# Patient Record
Sex: MALE | Race: WHITE | NOT HISPANIC OR LATINO | Employment: OTHER | URBAN - METROPOLITAN AREA
[De-identification: names, ages, dates, MRNs, and addresses within clinical notes are randomized per-mention and may not be internally consistent; named-entity substitution may affect disease eponyms.]

---

## 2018-12-18 ENCOUNTER — HOSPITAL ENCOUNTER (OUTPATIENT)
Dept: CARDIAC REHAB | Facility: HOSPITAL | Age: 68
Discharge: HOME | End: 2018-12-18
Attending: INTERNAL MEDICINE | Admitting: INTERNAL MEDICINE

## 2019-06-26 RX ORDER — ATORVASTATIN CALCIUM 80 MG/1
80 TABLET, FILM COATED ORAL
Qty: 90 TABLET | Refills: 1 | Status: SHIPPED | OUTPATIENT
Start: 2019-06-26 | End: 2019-12-27

## 2019-06-26 RX ORDER — ATORVASTATIN CALCIUM 80 MG/1
80 TABLET, FILM COATED ORAL
COMMUNITY
Start: 2012-06-23 | End: 2019-06-26 | Stop reason: SDUPTHER

## 2019-07-01 RX ORDER — LOSARTAN POTASSIUM 25 MG/1
25 TABLET ORAL EVERY 24 HOURS
Qty: 90 TABLET | Refills: 1 | Status: SHIPPED | OUTPATIENT
Start: 2019-07-01 | End: 2019-12-27

## 2019-07-01 RX ORDER — LOSARTAN POTASSIUM 25 MG/1
25 TABLET ORAL EVERY 24 HOURS
COMMUNITY
Start: 2012-06-23 | End: 2019-07-01 | Stop reason: SDUPTHER

## 2019-11-15 ENCOUNTER — TELEPHONE (OUTPATIENT)
Dept: CARDIOLOGY | Facility: CLINIC | Age: 69
End: 2019-11-15

## 2019-11-15 RX ORDER — POTASSIUM CHLORIDE 1500 MG/1
TABLET, FILM COATED, EXTENDED RELEASE ORAL EVERY 24 HOURS
COMMUNITY
Start: 2018-12-05 | End: 2019-11-15 | Stop reason: SDUPTHER

## 2019-11-15 RX ORDER — POTASSIUM CHLORIDE 1500 MG/1
20 TABLET, FILM COATED, EXTENDED RELEASE ORAL DAILY
Qty: 90 TABLET | Refills: 1 | Status: SHIPPED | OUTPATIENT
Start: 2019-11-15 | End: 2020-05-26

## 2019-11-15 NOTE — TELEPHONE ENCOUNTER
Saw [facility]  Potasium cl 20 meo ER, 1 daily, 90 day supply with refills  Call from daughter, April [phone]

## 2019-12-27 RX ORDER — ATORVASTATIN CALCIUM 80 MG/1
80 TABLET, FILM COATED ORAL
Qty: 30 TABLET | Refills: 0 | Status: SHIPPED | OUTPATIENT
Start: 2019-12-27 | End: 2020-01-08 | Stop reason: SDUPTHER

## 2019-12-27 RX ORDER — LOSARTAN POTASSIUM 25 MG/1
25 TABLET ORAL EVERY 24 HOURS
Qty: 90 TABLET | OUTPATIENT
Start: 2019-12-27

## 2019-12-27 RX ORDER — ATORVASTATIN CALCIUM 80 MG/1
80 TABLET, FILM COATED ORAL
Qty: 90 TABLET | OUTPATIENT
Start: 2019-12-27

## 2019-12-27 RX ORDER — LOSARTAN POTASSIUM 25 MG/1
25 TABLET ORAL EVERY 24 HOURS
Qty: 30 TABLET | Refills: 0 | Status: SHIPPED | OUTPATIENT
Start: 2019-12-27 | End: 2020-01-08 | Stop reason: SDUPTHER

## 2020-01-08 ENCOUNTER — OFFICE VISIT (OUTPATIENT)
Dept: CARDIOLOGY | Facility: CLINIC | Age: 70
End: 2020-01-08

## 2020-01-08 VITALS
OXYGEN SATURATION: 98 % | SYSTOLIC BLOOD PRESSURE: 94 MMHG | WEIGHT: 177 LBS | BODY MASS INDEX: 24.78 KG/M2 | DIASTOLIC BLOOD PRESSURE: 60 MMHG | HEART RATE: 61 BPM | HEIGHT: 71 IN

## 2020-01-08 DIAGNOSIS — E78.2 MIXED HYPERLIPIDEMIA: ICD-10-CM

## 2020-01-08 DIAGNOSIS — I25.5 ISCHEMIC CARDIOMYOPATHY: Primary | ICD-10-CM

## 2020-01-08 DIAGNOSIS — I25.10 CORONARY ARTERY DISEASE INVOLVING NATIVE CORONARY ARTERY OF NATIVE HEART WITHOUT ANGINA PECTORIS: ICD-10-CM

## 2020-01-08 DIAGNOSIS — I10 ESSENTIAL HYPERTENSION: ICD-10-CM

## 2020-01-08 PROCEDURE — 99213 OFFICE O/P EST LOW 20 MIN: CPT | Performed by: INTERNAL MEDICINE

## 2020-01-08 RX ORDER — CYANOCOBALAMIN (VITAMIN B-12) 5000 MCG
TABLET,DISINTEGRATING ORAL
COMMUNITY
End: 2020-10-07 | Stop reason: ALTCHOICE

## 2020-01-08 RX ORDER — ALLOPURINOL 300 MG/1
TABLET ORAL DAILY
COMMUNITY
Start: 2019-10-31

## 2020-01-08 RX ORDER — CARVEDILOL 6.25 MG/1
6.25 TABLET ORAL 2 TIMES DAILY WITH MEALS
COMMUNITY
Start: 2020-01-07

## 2020-01-08 RX ORDER — LOSARTAN POTASSIUM 25 MG/1
25 TABLET ORAL EVERY 24 HOURS
Qty: 90 TABLET | Refills: 1 | Status: SHIPPED | OUTPATIENT
Start: 2020-01-08 | End: 2020-01-28

## 2020-01-08 RX ORDER — THEOPHYLLINE 400 MG/1
TABLET, EXTENDED RELEASE ORAL DAILY
COMMUNITY
Start: 2019-10-14

## 2020-01-08 RX ORDER — FUROSEMIDE 40 MG/1
TABLET ORAL DAILY
COMMUNITY
Start: 2019-10-31

## 2020-01-08 RX ORDER — ATORVASTATIN CALCIUM 80 MG/1
80 TABLET, FILM COATED ORAL
Qty: 90 TABLET | Refills: 1 | Status: SHIPPED | OUTPATIENT
Start: 2020-01-08 | End: 2020-01-28

## 2020-01-08 RX ORDER — CLOPIDOGREL BISULFATE 75 MG/1
TABLET ORAL DAILY
COMMUNITY
Start: 2019-10-29

## 2020-01-08 RX ORDER — IPRATROPIUM/ALBUTEROL SULFATE 20-100 MCG
MIST INHALER (GRAM) INHALATION
COMMUNITY
Start: 2019-12-30

## 2020-01-08 NOTE — PROGRESS NOTES
"    Subjective:     Encounter Date:01/08/2020      Patient ID: Malachi Sanford is a 69 y.o. male.    Chief Complaint: Follow-up for CAD, HTN, & HLD  History of Present Illness     69-year-old white male patient with known history of CAD status post CABG almost 18 years ago history of COPD unfortunately continues to smoke history of dyslipidemia hypertension comes to see me for follow-up     patient was advised to get a stress Myoview and echocardiogram   patient refused stress test but agreed to get the echocardiogram   Patient echocardiogram December 2018 EF 35-40% with anterolateral wall hypokinesis   I advise cardiac catheterization but patient refused   continue current medications   patient was strongly advised to modify cardiac risk factors and stop smoking  Patient would like to see me once a year he fully understand the risks of sudden cardiac death  But refused any further cardiac intervention at this point including cardiac cath ICD        Past Medical History:   Diagnosis Date   • Cardiomyopathy (CMS/HCC)    • CHF (congestive heart failure) (CMS/[facility])    • COPD (chronic obstructive pulmonary disease) (CMS/[facility])    • Coronary heart disease    • Depression    • Hyperlipidemia    • Hypertension      Past Surgical History:   Procedure Laterality Date   • CORONARY ARTERY BYPASS GRAFT      x4   • KNEE SURGERY Right      BP 94/60 (BP Location: Left arm, Patient Position: Sitting, Cuff Size: Adult)   Pulse 61   Ht 180.3 cm (71\")   Wt 80.3 kg (177 lb)   SpO2 98%   BMI 24.69 kg/m²   Family History   Problem Relation Age of Onset   • No Known Problems Mother    • No Known Problems Father    • No Known Problems Sister    • Heart disease Brother    • No Known Problems Maternal Aunt    • No Known Problems Maternal Uncle    • No Known Problems Paternal Aunt    • No Known Problems Paternal Uncle    • No Known Problems Maternal Grandmother    • No Known Problems Maternal Grandfather    • No Known Problems Paternal " Grandmother    • No Known Problems Paternal Grandfather    • No Known Problems Other    • Anemia Neg Hx    • Arrhythmia Neg Hx    • Asthma Neg Hx    • Clotting disorder Neg Hx    • Fainting Neg Hx    • Heart attack Neg Hx    • Heart failure Neg Hx    • Hyperlipidemia Neg Hx    • Hypertension Neg Hx        Current Outpatient Medications:   •  allopurinol (ZYLOPRIM) 300 MG tablet, Take  by mouth Daily., Disp: , Rfl:   •  aspirin 81 MG tablet, Take 81 mg by mouth Daily., Disp: , Rfl:   •  atorvastatin (LIPITOR) 80 MG tablet, Take 1 tablet by mouth every night at bedtime., Disp: 90 tablet, Rfl: 1  •  BREO ELLIPTA 200-25 MCG/INH inhaler, INL 1 PUFF PO QD, Disp: , Rfl:   •  carvedilol (COREG) 6.25 MG tablet, , Disp: , Rfl:   •  clopidogrel (PLAVIX) 75 MG tablet, Take  by mouth Daily., Disp: , Rfl:   •  COMBIVENT RESPIMAT  MCG/ACT inhaler, INL 1 PUFF PO FOUR TIMES DAILY. MAY TK ADDITIONAL PFS AS NEEDED. NTE 6 PFS IN 24 H, Disp: , Rfl:   •  Cyanocobalamin (VITAMIN B-12) 5000 MCG tablet dispersible, Take  by mouth., Disp: , Rfl:   •  furosemide (LASIX) 40 MG tablet, Take  by mouth Daily., Disp: , Rfl:   •  losartan (COZAAR) 25 MG tablet, Take 1 tablet by mouth Daily., Disp: 90 tablet, Rfl: 1  •  potassium chloride ER (K-TAB) 20 MEQ tablet controlled-release ER tablet, Take 1 tablet by mouth Daily., Disp: 90 tablet, Rfl: 1  •  sertraline (ZOLOFT) 50 MG tablet, Take  by mouth Daily., Disp: , Rfl:   •  theophylline (UNIPHYL) 400 MG 24 hr tablet, Take  by mouth Daily., Disp: , Rfl:   Social History     Socioeconomic History   • Marital status:      Spouse name: Not on file   • Number of children: Not on file   • Years of education: Not on file   • Highest education level: Not on file   Tobacco Use   • Smoking status: Current Every Day Smoker     Packs/day: 0.50     Types: Cigarettes   • Smokeless tobacco: Current User   Substance and Sexual Activity   • Alcohol use: Never     Frequency: Never   • Drug use: Yes      Types: Marijuana   • Sexual activity: Defer     No Known Allergies  Review of Systems   Constitution: Negative for fever and malaise/fatigue.   HENT: Negative for congestion and hearing loss.    Eyes: Negative for double vision and visual disturbance.   Cardiovascular: Negative for chest pain, claudication, dyspnea on exertion, leg swelling and syncope.   Respiratory: Negative for cough and shortness of breath.    Endocrine: Negative for cold intolerance.   Skin: Negative for color change and rash.   Musculoskeletal: Negative for arthritis and joint pain.   Gastrointestinal: Negative for abdominal pain and heartburn.   Genitourinary: Negative for hematuria.   Neurological: Negative for excessive daytime sleepiness and dizziness.   Psychiatric/Behavioral: Negative for depression. The patient is not nervous/anxious.    All other systems reviewed and are negative.             Objective:     Physical Exam   Constitutional: He is oriented to person, place, and time. He appears well-developed and well-nourished. He is cooperative.   HENT:   Head: Normocephalic and atraumatic.   Mouth/Throat: Uvula is midline and oropharynx is clear and moist. No oral lesions.   Eyes: Conjunctivae are normal. No scleral icterus.   Neck: Trachea normal. Neck supple. No JVD present. Carotid bruit is not present. No thyromegaly present.   Cardiovascular: Normal rate, regular rhythm, S1 normal, S2 normal, normal heart sounds, intact distal pulses and normal pulses. PMI is not displaced. Exam reveals no gallop and no friction rub.   No murmur heard.  Pulmonary/Chest: Effort normal and breath sounds normal.   Abdominal: Soft. Bowel sounds are normal.   Musculoskeletal: Normal range of motion.   Neurological: He is alert and oriented to person, place, and time. He has normal strength.   No focal deficits   Skin: Skin is warm. No cyanosis.   Psychiatric: He has a normal mood and affect.       Procedures    Lab Review:       Assessment:           Diagnosis Plan   1. Ischemic cardiomyopathy  Adult Transthoracic Echo Complete W/ Cont if Necessary Per Protocol   2. Essential hypertension  Adult Transthoracic Echo Complete W/ Cont if Necessary Per Protocol   3. Mixed hyperlipidemia  Adult Transthoracic Echo Complete W/ Cont if Necessary Per Protocol   4. Coronary artery disease involving native coronary artery of native heart without angina pectoris  Adult Transthoracic Echo Complete W/ Cont if Necessary Per Protocol          Plan:       Most probably ischemic cardiomyopathy on conservative treatment patient fully understand the risks of sudden cardiac death  Needs to aggressively continue hypertension dyslipidemia patient was advised to stop smoking

## 2020-01-13 ENCOUNTER — TELEPHONE (OUTPATIENT)
Dept: CARDIOLOGY | Facility: CLINIC | Age: 70
End: 2020-01-13

## 2020-01-13 NOTE — TELEPHONE ENCOUNTER
Mr. Sanford was in office last week and an ECHO was ordered.   Just confirming if ECHO is needed now or in a year with follow up?  Please advise.

## 2020-01-28 RX ORDER — LOSARTAN POTASSIUM 25 MG/1
25 TABLET ORAL EVERY 24 HOURS
Qty: 90 TABLET | Refills: 2 | Status: SHIPPED | OUTPATIENT
Start: 2020-01-28 | End: 2020-07-28

## 2020-01-28 RX ORDER — ATORVASTATIN CALCIUM 80 MG/1
80 TABLET, FILM COATED ORAL
Qty: 90 TABLET | Refills: 2 | Status: SHIPPED | OUTPATIENT
Start: 2020-01-28 | End: 2020-07-28

## 2020-05-26 RX ORDER — POTASSIUM CHLORIDE 1500 MG/1
20 TABLET, FILM COATED, EXTENDED RELEASE ORAL DAILY
Qty: 90 TABLET | Refills: 3 | Status: SHIPPED | OUTPATIENT
Start: 2020-05-26 | End: 2021-01-01

## 2020-07-28 RX ORDER — ATORVASTATIN CALCIUM 80 MG/1
80 TABLET, FILM COATED ORAL
Qty: 90 TABLET | Refills: 2 | Status: SHIPPED | OUTPATIENT
Start: 2020-07-28

## 2020-07-28 RX ORDER — LOSARTAN POTASSIUM 25 MG/1
25 TABLET ORAL EVERY 24 HOURS
Qty: 90 TABLET | Refills: 2 | Status: SHIPPED | OUTPATIENT
Start: 2020-07-28 | End: 2021-01-01

## 2020-09-10 ENCOUNTER — TRANSCRIBE ORDERS (OUTPATIENT)
Dept: ADMINISTRATIVE | Facility: HOSPITAL | Age: 70
End: 2020-09-10

## 2020-09-10 DIAGNOSIS — F17.200 TOBACCO USE DISORDER: Primary | ICD-10-CM

## 2020-09-16 ENCOUNTER — APPOINTMENT (OUTPATIENT)
Dept: CT IMAGING | Facility: HOSPITAL | Age: 70
End: 2020-09-16

## 2020-09-22 ENCOUNTER — HOSPITAL ENCOUNTER (OUTPATIENT)
Dept: CT IMAGING | Facility: HOSPITAL | Age: 70
Discharge: HOME | End: 2020-09-22
Admitting: FAMILY MEDICINE

## 2020-09-22 DIAGNOSIS — F17.200 TOBACCO USE DISORDER: ICD-10-CM

## 2020-09-22 PROCEDURE — G0297 LDCT FOR LUNG CA SCREEN: HCPCS

## 2020-09-25 ENCOUNTER — TRANSCRIBE ORDERS (OUTPATIENT)
Dept: ADMINISTRATIVE | Facility: HOSPITAL | Age: 70
End: 2020-09-25

## 2020-09-25 DIAGNOSIS — I71.40 ABDOMINAL AORTIC ANEURYSM WITHOUT RUPTURE (HCC): Primary | ICD-10-CM

## 2020-10-01 ENCOUNTER — HOSPITAL ENCOUNTER (OUTPATIENT)
Dept: CT IMAGING | Facility: HOSPITAL | Age: 70
Discharge: HOME | End: 2020-10-01
Admitting: FAMILY MEDICINE

## 2020-10-01 DIAGNOSIS — I71.40 ABDOMINAL AORTIC ANEURYSM WITHOUT RUPTURE (HCC): ICD-10-CM

## 2020-10-01 LAB — CREAT BLDA-MCNC: 1 MG/DL (ref 0.6–1.3)

## 2020-10-01 PROCEDURE — 74174 CTA ABD&PLVS W/CONTRAST: CPT

## 2020-10-01 PROCEDURE — 0 IOPAMIDOL PER 1 ML: Performed by: FAMILY MEDICINE

## 2020-10-01 PROCEDURE — 82565 ASSAY OF CREATININE: CPT

## 2020-10-01 RX ADMIN — IOPAMIDOL 100 ML: 755 INJECTION, SOLUTION INTRAVENOUS at 17:00

## 2020-10-07 ENCOUNTER — OFFICE VISIT (OUTPATIENT)
Dept: CARDIOLOGY | Facility: CLINIC | Age: 70
End: 2020-10-07

## 2020-10-07 VITALS
OXYGEN SATURATION: 97 % | HEART RATE: 66 BPM | DIASTOLIC BLOOD PRESSURE: 55 MMHG | HEIGHT: 71 IN | SYSTOLIC BLOOD PRESSURE: 90 MMHG | BODY MASS INDEX: 22.82 KG/M2 | WEIGHT: 163 LBS

## 2020-10-07 DIAGNOSIS — I10 ESSENTIAL HYPERTENSION: ICD-10-CM

## 2020-10-07 DIAGNOSIS — I71.40 AAA (ABDOMINAL AORTIC ANEURYSM) WITHOUT RUPTURE (HCC): Primary | ICD-10-CM

## 2020-10-07 DIAGNOSIS — I25.5 ISCHEMIC CARDIOMYOPATHY: ICD-10-CM

## 2020-10-07 DIAGNOSIS — E78.2 MIXED HYPERLIPIDEMIA: ICD-10-CM

## 2020-10-07 DIAGNOSIS — I25.10 CORONARY ARTERY DISEASE INVOLVING NATIVE CORONARY ARTERY OF NATIVE HEART WITHOUT ANGINA PECTORIS: ICD-10-CM

## 2020-10-07 PROCEDURE — 99214 OFFICE O/P EST MOD 30 MIN: CPT | Performed by: INTERNAL MEDICINE

## 2020-10-07 RX ORDER — FERROUS SULFATE 325(65) MG
1 TABLET ORAL 2 TIMES DAILY
COMMUNITY
Start: 2020-09-14

## 2020-10-07 RX ORDER — NITROGLYCERIN 0.4 MG/1
TABLET SUBLINGUAL
COMMUNITY
Start: 2020-08-10

## 2020-10-07 NOTE — PROGRESS NOTES
"    Subjective:     Encounter Date:10/07/2020      Patient ID: Malachi Sanford is a 70 y.o. male.    Chief Complaint: AAA and follow-up coronary Artery Disease, Hypertension, & Cardiomyopathy  History of Present Illness     70-year-old white male patient with known history of CAD status post CABG almost 18 years ago history of COPD unfortunately continues to smoke history of dyslipidemia hypertension comes to see me for follow-up      patient was advised to get a stress Myoview and echocardiogram   patient refused stress test but agreed to get the echocardiogram and it was scheduled for January 2021    Patient echocardiogram December 2018 EF 35-40% with anterolateral wall hypokinesis   I advise cardiac catheterization but patient refused   continue current medications   patient was strongly advised to modify cardiac risk factors and stop smoking  Patient would like to see me once a year he fully understand the risks of sudden cardiac death  But refused any further cardiac intervention at this point including cardiac cath ICD    Recently patient had a CT scan of abdomen which showed 6.7 cm aneurysm  So I again advised patient to stop smoking will have a vascular surgery consultation    The following portions of the patient's history were reviewed and updated as appropriate: Allergies current medications past family history past medical history past social history past surgical history problem list and review of systems  Past Medical History:   Diagnosis Date   • Cardiomyopathy (CMS/HCC)    • CHF (congestive heart failure) (CMS/HCC)    • COPD (chronic obstructive pulmonary disease) (CMS/HCC)    • Coronary heart disease    • Depression    • Hyperlipidemia    • Hypertension      Past Surgical History:   Procedure Laterality Date   • CORONARY ARTERY BYPASS GRAFT      x4   • KNEE SURGERY Right      BP 90/55 (BP Location: Left arm, Patient Position: Sitting, Cuff Size: Adult)   Pulse 66   Ht 180.3 cm (71\")   Wt 73.9 kg " (163 lb)   SpO2 97%   BMI 22.73 kg/m²   Family History   Problem Relation Age of Onset   • No Known Problems Mother    • No Known Problems Father    • No Known Problems Sister    • Heart disease Brother    • No Known Problems Maternal Aunt    • No Known Problems Maternal Uncle    • No Known Problems Paternal Aunt    • No Known Problems Paternal Uncle    • No Known Problems Maternal Grandmother    • No Known Problems Maternal Grandfather    • No Known Problems Paternal Grandmother    • No Known Problems Paternal Grandfather    • No Known Problems Other    • Anemia Neg Hx    • Arrhythmia Neg Hx    • Asthma Neg Hx    • Clotting disorder Neg Hx    • Fainting Neg Hx    • Heart attack Neg Hx    • Heart failure Neg Hx    • Hyperlipidemia Neg Hx    • Hypertension Neg Hx        Current Outpatient Medications:   •  allopurinol (ZYLOPRIM) 300 MG tablet, Take  by mouth Daily., Disp: , Rfl:   •  aspirin 81 MG tablet, Take 81 mg by mouth Daily., Disp: , Rfl:   •  atorvastatin (LIPITOR) 80 MG tablet, TAKE 1 TABLET BY MOUTH EVERY NIGHT AT BEDTIME, Disp: 90 tablet, Rfl: 2  •  BREO ELLIPTA 200-25 MCG/INH inhaler, INL 1 PUFF PO QD, Disp: , Rfl:   •  carvedilol (COREG) 6.25 MG tablet, Take 6.25 mg by mouth 2 (Two) Times a Day With Meals., Disp: , Rfl:   •  clopidogrel (PLAVIX) 75 MG tablet, Take  by mouth Daily., Disp: , Rfl:   •  COMBIVENT RESPIMAT  MCG/ACT inhaler, INL 1 PUFF PO FOUR TIMES DAILY. MAY TK ADDITIONAL PFS AS NEEDED. NTE 6 PFS IN 24 H, Disp: , Rfl:   •  ferrous sulfate 325 (65 FE) MG tablet, Take 1 tablet by mouth 2 (Two) Times a Day., Disp: , Rfl:   •  furosemide (LASIX) 40 MG tablet, Take  by mouth Daily., Disp: , Rfl:   •  losartan (COZAAR) 25 MG tablet, TAKE 1 TABLET BY MOUTH DAILY, Disp: 90 tablet, Rfl: 2  •  nitroglycerin (NITROSTAT) 0.4 MG SL tablet, , Disp: , Rfl:   •  potassium chloride ER (K-TAB) 20 MEQ tablet controlled-release ER tablet, TAKE 1 TABLET BY MOUTH DAILY, Disp: 90 tablet, Rfl: 3  •   sertraline (ZOLOFT) 50 MG tablet, Take  by mouth Daily., Disp: , Rfl:   •  theophylline (UNIPHYL) 400 MG 24 hr tablet, Take  by mouth Daily., Disp: , Rfl:   Social History     Socioeconomic History   • Marital status:      Spouse name: Not on file   • Number of children: Not on file   • Years of education: Not on file   • Highest education level: Not on file   Tobacco Use   • Smoking status: Current Every Day Smoker     Packs/day: 0.50     Types: Cigarettes   • Smokeless tobacco: Never Used   Substance and Sexual Activity   • Alcohol use: Never     Frequency: Never   • Drug use: Yes     Types: Marijuana   • Sexual activity: Defer     No Known Allergies  Review of Systems   Constitution: Negative for fever and malaise/fatigue.   HENT: Negative for congestion and hearing loss.    Eyes: Negative for double vision and visual disturbance.   Cardiovascular: Negative for chest pain, claudication, dyspnea on exertion, leg swelling and syncope.   Respiratory: Negative for cough and shortness of breath.    Endocrine: Negative for cold intolerance.   Skin: Negative for color change and rash.   Musculoskeletal: Negative for arthritis and joint pain.   Gastrointestinal: Negative for abdominal pain and heartburn.   Genitourinary: Negative for hematuria.   Neurological: Negative for excessive daytime sleepiness and dizziness.   Psychiatric/Behavioral: Negative for depression. The patient is not nervous/anxious.    All other systems reviewed and are negative.             Objective:     Constitutional:       Appearance: Well-developed.   Eyes:      General: No scleral icterus.     Conjunctiva/sclera: Conjunctivae normal.   HENT:      Head: Normocephalic and atraumatic.    Mouth/Throat:      Mouth: No oral lesions.      Pharynx: Uvula midline.   Neck:      Musculoskeletal: Neck supple.      Thyroid: No thyromegaly.      Vascular: No carotid bruit or JVD.      Trachea: Trachea normal.   Pulmonary:      Effort: Pulmonary effort  is normal.      Breath sounds: Normal breath sounds.   Cardiovascular:      Normal rate. Regular rhythm.      No gallop.   Pulses:     Decreased pulses.   Abdominal:      General: Bowel sounds are normal.      Palpations: Abdomen is soft.   Musculoskeletal: Normal range of motion.   Skin:     General: Skin is warm. There is no cyanosis.   Neurological:      Mental Status: Alert and oriented to person, place, and time.      Comments: No focal deficits   Psychiatric:         Behavior: Behavior is cooperative.         Procedures    Lab Review:       Assessment:          Diagnosis Plan   1. AAA (abdominal aortic aneurysm) without rupture (CMS/HCC)     2. Coronary artery disease involving native coronary artery of native heart without angina pectoris     3. Ischemic cardiomyopathy     4. Mixed hyperlipidemia     5. Essential hypertension            Plan:         AAA needs vascular surgery evaluation ASAP  Patient was strongly advised to stop smoking  Ischemic cardiomyopathy patient previously refused further intervention  Echocardiogram in the near future  Continue aggressive control of hypertension dyslipidemia  Needs to modify cardiac risk factors stop smoking

## 2020-10-08 ENCOUNTER — APPOINTMENT (OUTPATIENT)
Dept: VASCULAR SURGERY | Facility: HOSPITAL | Age: 70
End: 2020-10-08

## 2020-10-08 PROCEDURE — G0463 HOSPITAL OUTPT CLINIC VISIT: HCPCS

## 2020-10-26 ENCOUNTER — APPOINTMENT (OUTPATIENT)
Dept: PET IMAGING | Facility: HOSPITAL | Age: 70
End: 2020-10-26

## 2021-01-01 ENCOUNTER — LAB (OUTPATIENT)
Dept: LAB | Facility: HOSPITAL | Age: 71
End: 2021-01-01

## 2021-01-01 ENCOUNTER — OFFICE VISIT (OUTPATIENT)
Dept: CARDIOLOGY | Facility: CLINIC | Age: 71
End: 2021-01-01

## 2021-01-01 ENCOUNTER — HOSPITAL ENCOUNTER (OUTPATIENT)
Dept: CARDIOLOGY | Facility: HOSPITAL | Age: 71
Discharge: HOME | End: 2021-03-23
Admitting: INTERNAL MEDICINE

## 2021-01-01 VITALS
TEMPERATURE: 97.1 F | OXYGEN SATURATION: 99 % | HEIGHT: 71 IN | HEART RATE: 76 BPM | BODY MASS INDEX: 23.94 KG/M2 | SYSTOLIC BLOOD PRESSURE: 95 MMHG | DIASTOLIC BLOOD PRESSURE: 66 MMHG | WEIGHT: 171 LBS

## 2021-01-01 VITALS — HEIGHT: 71 IN | WEIGHT: 171 LBS | BODY MASS INDEX: 23.94 KG/M2 | TEMPERATURE: 97.8 F

## 2021-01-01 VITALS — HEIGHT: 71 IN | WEIGHT: 171 LBS | BODY MASS INDEX: 23.94 KG/M2

## 2021-01-01 DIAGNOSIS — I25.10 CORONARY ARTERY DISEASE INVOLVING NATIVE CORONARY ARTERY OF NATIVE HEART WITHOUT ANGINA PECTORIS: ICD-10-CM

## 2021-01-01 DIAGNOSIS — E78.2 MIXED HYPERLIPIDEMIA: ICD-10-CM

## 2021-01-01 DIAGNOSIS — I25.5 ISCHEMIC CARDIOMYOPATHY: ICD-10-CM

## 2021-01-01 DIAGNOSIS — I50.43 ACUTE ON CHRONIC COMBINED SYSTOLIC AND DIASTOLIC CONGESTIVE HEART FAILURE (HCC): ICD-10-CM

## 2021-01-01 DIAGNOSIS — I71.40 AAA (ABDOMINAL AORTIC ANEURYSM) WITHOUT RUPTURE (HCC): ICD-10-CM

## 2021-01-01 DIAGNOSIS — I25.5 ISCHEMIC CARDIOMYOPATHY: Primary | ICD-10-CM

## 2021-01-01 DIAGNOSIS — I10 ESSENTIAL HYPERTENSION: ICD-10-CM

## 2021-01-01 DIAGNOSIS — I71.40 AAA (ABDOMINAL AORTIC ANEURYSM) WITHOUT RUPTURE (HCC): Primary | ICD-10-CM

## 2021-01-01 LAB
ANION GAP SERPL CALCULATED.3IONS-SCNC: 10.3 MMOL/L (ref 5–15)
BH CV ECHO MEAS - AI DEC SLOPE: 133.6 CM/SEC^2
BH CV ECHO MEAS - AI DEC TIME: 1.9 SEC
BH CV ECHO MEAS - AI MAX PG: 25.4 MMHG
BH CV ECHO MEAS - AI MAX VEL: 252.1 CM/SEC
BH CV ECHO MEAS - AI P1/2T: 552.6 MSEC
BH CV ECHO MEAS - AO MAX PG (FULL): 2.3 MMHG
BH CV ECHO MEAS - AO MAX PG: 4.5 MMHG
BH CV ECHO MEAS - AO MEAN PG (FULL): 1.2 MMHG
BH CV ECHO MEAS - AO MEAN PG: 2.5 MMHG
BH CV ECHO MEAS - AO ROOT AREA (BSA CORRECTED): 1.4
BH CV ECHO MEAS - AO ROOT AREA: 6 CM^2
BH CV ECHO MEAS - AO ROOT DIAM: 2.8 CM
BH CV ECHO MEAS - AO V2 MAX: 105.7 CM/SEC
BH CV ECHO MEAS - AO V2 MEAN: 75.1 CM/SEC
BH CV ECHO MEAS - AO V2 VTI: 20.3 CM
BH CV ECHO MEAS - AVA(I,A): 2.3 CM^2
BH CV ECHO MEAS - AVA(I,D): 2.3 CM^2
BH CV ECHO MEAS - AVA(V,A): 2.3 CM^2
BH CV ECHO MEAS - AVA(V,D): 2.3 CM^2
BH CV ECHO MEAS - BSA(HAYCOCK): 2 M^2
BH CV ECHO MEAS - BSA: 2 M^2
BH CV ECHO MEAS - BZI_BMI: 23.8 KILOGRAMS/M^2
BH CV ECHO MEAS - BZI_METRIC_HEIGHT: 180.3 CM
BH CV ECHO MEAS - BZI_METRIC_WEIGHT: 77.6 KG
BH CV ECHO MEAS - EDV(CUBED): 184.2 ML
BH CV ECHO MEAS - EDV(MOD-SP4): 129.1 ML
BH CV ECHO MEAS - EDV(TEICH): 159.4 ML
BH CV ECHO MEAS - EF(CUBED): 29.7 %
BH CV ECHO MEAS - EF(MOD-SP4): 17.9 %
BH CV ECHO MEAS - EF(TEICH): 23.7 %
BH CV ECHO MEAS - ESV(CUBED): 129.6 ML
BH CV ECHO MEAS - ESV(MOD-SP4): 105.9 ML
BH CV ECHO MEAS - ESV(TEICH): 121.6 ML
BH CV ECHO MEAS - FS: 11.1 %
BH CV ECHO MEAS - IVS/LVPW: 1.1
BH CV ECHO MEAS - IVSD: 1.1 CM
BH CV ECHO MEAS - LA DIMENSION: 6.2 CM
BH CV ECHO MEAS - LA/AO: 2.3
BH CV ECHO MEAS - LV DIASTOLIC VOL/BSA (35-75): 65.4 ML/M^2
BH CV ECHO MEAS - LV MASS(C)D: 226.7 GRAMS
BH CV ECHO MEAS - LV MASS(C)DI: 114.9 GRAMS/M^2
BH CV ECHO MEAS - LV MAX PG: 2.2 MMHG
BH CV ECHO MEAS - LV MEAN PG: 1.3 MMHG
BH CV ECHO MEAS - LV SYSTOLIC VOL/BSA (12-30): 53.7 ML/M^2
BH CV ECHO MEAS - LV V1 MAX: 74.4 CM/SEC
BH CV ECHO MEAS - LV V1 MEAN: 52.9 CM/SEC
BH CV ECHO MEAS - LV V1 VTI: 14.5 CM
BH CV ECHO MEAS - LVIDD: 5.7 CM
BH CV ECHO MEAS - LVIDS: 5.1 CM
BH CV ECHO MEAS - LVOT AREA: 3.2 CM^2
BH CV ECHO MEAS - LVOT DIAM: 2 CM
BH CV ECHO MEAS - LVPWD: 0.95 CM
BH CV ECHO MEAS - MV A MAX VEL: 31.5 CM/SEC
BH CV ECHO MEAS - MV DEC SLOPE: 611.2 CM/SEC^2
BH CV ECHO MEAS - MV DEC TIME: 0.16 SEC
BH CV ECHO MEAS - MV E MAX VEL: 96.9 CM/SEC
BH CV ECHO MEAS - MV E/A: 3.1
BH CV ECHO MEAS - MV MAX PG: 4.8 MMHG
BH CV ECHO MEAS - MV MEAN PG: 2 MMHG
BH CV ECHO MEAS - MV V2 MAX: 109.7 CM/SEC
BH CV ECHO MEAS - MV V2 MEAN: 65.6 CM/SEC
BH CV ECHO MEAS - MV V2 VTI: 20.2 CM
BH CV ECHO MEAS - MVA(VTI): 2.3 CM^2
BH CV ECHO MEAS - RAP SYSTOLE: 8 MMHG
BH CV ECHO MEAS - RVDD: 4.3 CM
BH CV ECHO MEAS - RVSP: 47.3 MMHG
BH CV ECHO MEAS - SI(AO): 61.4 ML/M^2
BH CV ECHO MEAS - SI(CUBED): 27.7 ML/M^2
BH CV ECHO MEAS - SI(LVOT): 23.6 ML/M^2
BH CV ECHO MEAS - SI(MOD-SP4): 11.7 ML/M^2
BH CV ECHO MEAS - SI(TEICH): 19.2 ML/M^2
BH CV ECHO MEAS - SV(AO): 121.1 ML
BH CV ECHO MEAS - SV(CUBED): 54.6 ML
BH CV ECHO MEAS - SV(LVOT): 46.5 ML
BH CV ECHO MEAS - SV(MOD-SP4): 23.1 ML
BH CV ECHO MEAS - SV(TEICH): 37.8 ML
BH CV ECHO MEAS - TR MAX VEL: 312.2 CM/SEC
BUN SERPL-MCNC: 13 MG/DL (ref 8–23)
BUN/CREAT SERPL: 10.1 (ref 7–25)
CALCIUM SPEC-SCNC: 9.3 MG/DL (ref 8.6–10.5)
CHLORIDE SERPL-SCNC: 103 MMOL/L (ref 98–107)
CO2 SERPL-SCNC: 26.7 MMOL/L (ref 22–29)
CREAT SERPL-MCNC: 1.29 MG/DL (ref 0.76–1.27)
GFR SERPL CREATININE-BSD FRML MDRD: 55 ML/MIN/1.73
GLUCOSE SERPL-MCNC: 76 MG/DL (ref 65–99)
LV EF 2D ECHO EST: 15 %
MAXIMAL PREDICTED HEART RATE: 152 BPM
NT-PROBNP SERPL-MCNC: 6017 PG/ML (ref 0–900)
POTASSIUM SERPL-SCNC: 4.9 MMOL/L (ref 3.5–5.2)
SODIUM SERPL-SCNC: 140 MMOL/L (ref 136–145)
STRESS TARGET HR: 129 BPM

## 2021-01-01 PROCEDURE — 99213 OFFICE O/P EST LOW 20 MIN: CPT | Performed by: INTERNAL MEDICINE

## 2021-01-01 PROCEDURE — 93306 TTE W/DOPPLER COMPLETE: CPT | Performed by: INTERNAL MEDICINE

## 2021-01-01 PROCEDURE — 93306 TTE W/DOPPLER COMPLETE: CPT

## 2021-01-01 PROCEDURE — 83880 ASSAY OF NATRIURETIC PEPTIDE: CPT

## 2021-01-01 PROCEDURE — 36415 COLL VENOUS BLD VENIPUNCTURE: CPT

## 2021-01-01 PROCEDURE — 80048 BASIC METABOLIC PNL TOTAL CA: CPT

## 2021-01-01 RX ORDER — POTASSIUM CHLORIDE 1500 MG/1
20 TABLET, FILM COATED, EXTENDED RELEASE ORAL DAILY
Qty: 90 TABLET | Refills: 3 | Status: SHIPPED | OUTPATIENT
Start: 2021-01-01

## 2021-01-01 RX ORDER — LOSARTAN POTASSIUM 25 MG/1
25 TABLET ORAL EVERY 24 HOURS
Qty: 90 TABLET | Refills: 2 | Status: SHIPPED | OUTPATIENT
Start: 2021-01-01

## 2021-03-12 NOTE — PROGRESS NOTES
Subjective:     Encounter Date:03/12/2021      Patient ID: Malachi Sanford is a 70 y.o. male.    Chief Complaint: Edema in Legs & Ankles  History of Present Illness     70-year-old white male patient with known history of CAD status post CABG almost 18 years ago history of COPD unfortunately continues to smoke history of dyslipidemia hypertension comes to see me for follow-up         Patient echocardiogram December 2018 EF 35-40% with anterolateral wall hypokinesis   I advise cardiac catheterization but patient refused       Recently patient had a CT scan of abdomen which showed 6.7 cm aneurysm  Patient was seen by vascular surgery decided not to undergo surgery and he fully understand the risks of sudden death by rupture  Unfortunately continues to smoke he comes to see me with increasing lower extremity edema shortness of breath  Blood pressure is low so advised him to be admitted to the hospital he does not want to do that  Options will be limited to treat him as an outpatient he fully understand he would like to continue current medications  Patient fully understand the risks of sudden cardiac death myocardial infarction worsening heart failure symptoms but still refused admission  During last visit he agreed to get this echocardiogram but never came for the appointment  Advised cardiac cath possible ICD placement refused everything and he fully understand all the risks again    The following portions of the patient's history were reviewed and updated as appropriate: Allergies current medications past family history past medical history past social history past surgical history problem list and review of systems  Past Medical History:   Diagnosis Date   • Cardiomyopathy (CMS/[facility])    • CHF (congestive heart failure) (CMS/[facility])    • COPD (chronic obstructive pulmonary disease) (CMS/[facility])    • Coronary heart disease    • Depression    • Hyperlipidemia    • Hypertension      Past Surgical History:   Procedure  "Laterality Date   • CORONARY ARTERY BYPASS GRAFT      x4   • KNEE SURGERY Right      Temp 97.8 °F (36.6 °C) (Infrared)   Ht 180.3 cm (71\")   Wt 77.6 kg (171 lb)   BMI 23.85 kg/m²   Family History   Problem Relation Age of Onset   • No Known Problems Mother    • No Known Problems Father    • No Known Problems Sister    • Heart disease Brother    • No Known Problems Maternal Aunt    • No Known Problems Maternal Uncle    • No Known Problems Paternal Aunt    • No Known Problems Paternal Uncle    • No Known Problems Maternal Grandmother    • No Known Problems Maternal Grandfather    • No Known Problems Paternal Grandmother    • No Known Problems Paternal Grandfather    • No Known Problems Other    • Anemia Neg Hx    • Arrhythmia Neg Hx    • Asthma Neg Hx    • Clotting disorder Neg Hx    • Fainting Neg Hx    • Heart attack Neg Hx    • Heart failure Neg Hx    • Hyperlipidemia Neg Hx    • Hypertension Neg Hx        Current Outpatient Medications:   •  allopurinol (ZYLOPRIM) 300 MG tablet, Take  by mouth Daily., Disp: , Rfl:   •  aspirin 81 MG tablet, Take 81 mg by mouth Daily., Disp: , Rfl:   •  atorvastatin (LIPITOR) 80 MG tablet, TAKE 1 TABLET BY MOUTH EVERY NIGHT AT BEDTIME, Disp: 90 tablet, Rfl: 2  •  BREO ELLIPTA 200-25 MCG/INH inhaler, INL 1 PUFF PO QD, Disp: , Rfl:   •  carvedilol (COREG) 6.25 MG tablet, Take 6.25 mg by mouth 2 (Two) Times a Day With Meals., Disp: , Rfl:   •  clopidogrel (PLAVIX) 75 MG tablet, Take  by mouth Daily., Disp: , Rfl:   •  COMBIVENT RESPIMAT  MCG/ACT inhaler, INL 1 PUFF PO FOUR TIMES DAILY. MAY TK ADDITIONAL PFS AS NEEDED. NTE 6 PFS IN 24 H, Disp: , Rfl:   •  ferrous sulfate 325 (65 FE) MG tablet, Take 1 tablet by mouth 2 (Two) Times a Day., Disp: , Rfl:   •  furosemide (LASIX) 40 MG tablet, Take  by mouth Daily., Disp: , Rfl:   •  losartan (COZAAR) 25 MG tablet, TAKE 1 TABLET BY MOUTH DAILY, Disp: 90 tablet, Rfl: 2  •  nitroglycerin (NITROSTAT) 0.4 MG SL tablet, , Disp: , Rfl: "   •  potassium chloride ER (K-TAB) 20 MEQ tablet controlled-release ER tablet, TAKE 1 TABLET BY MOUTH DAILY, Disp: 90 tablet, Rfl: 3  •  sertraline (ZOLOFT) 50 MG tablet, Take  by mouth Daily., Disp: , Rfl:   •  theophylline (UNIPHYL) 400 MG 24 hr tablet, Take  by mouth Daily., Disp: , Rfl:   Social History     Socioeconomic History   • Marital status:      Spouse name: Not on file   • Number of children: Not on file   • Years of education: Not on file   • Highest education level: Not on file   Tobacco Use   • Smoking status: Current Every Day Smoker     Packs/day: 0.50     Types: Cigarettes   • Smokeless tobacco: Never Used   Vaping Use   • Vaping Use: Never used   Substance and Sexual Activity   • Alcohol use: Never   • Drug use: Yes     Types: Marijuana   • Sexual activity: Defer     No Known Allergies  Review of Systems   Constitutional: Negative for fever and malaise/fatigue.   HENT: Negative for congestion and hearing loss.    Eyes: Negative for double vision and visual disturbance.   Cardiovascular: Positive for leg swelling. Negative for chest pain, claudication, dyspnea on exertion and syncope.   Respiratory: Negative for cough and shortness of breath.    Endocrine: Negative for cold intolerance.   Skin: Negative for color change and rash.   Musculoskeletal: Negative for arthritis and joint pain.   Gastrointestinal: Negative for abdominal pain and heartburn.   Genitourinary: Negative for hematuria.   Neurological: Negative for excessive daytime sleepiness and dizziness.   Psychiatric/Behavioral: Negative for depression. The patient is not nervous/anxious.    All other systems reviewed and are negative.             Objective:     Physical Exam  Blood pressure 90/60 heart rate in the 80s regular weight 171 pounds neck no significant JVP elevation lungs bilateral entry few rhonchi heart sounds S1-S2 regular S3 gallop noted extremities bilateral 2+ edema  Procedures    Lab Review:       Assessment:           Diagnosis Plan   1. Ischemic cardiomyopathy  Basic Metabolic Panel    BNP    Adult Transthoracic Echo Complete W/ Cont if Necessary Per Protocol   2. Coronary artery disease involving native coronary artery of native heart without angina pectoris  Basic Metabolic Panel    BNP    Adult Transthoracic Echo Complete W/ Cont if Necessary Per Protocol   3. AAA (abdominal aortic aneurysm) without rupture (CMS/HCC)  Basic Metabolic Panel    BNP    Adult Transthoracic Echo Complete W/ Cont if Necessary Per Protocol   4. Acute on chronic combined systolic and diastolic congestive heart failure (CMS/HCC)  Basic Metabolic Panel    BNP    Adult Transthoracic Echo Complete W/ Cont if Necessary Per Protocol   5. Essential hypertension  Basic Metabolic Panel    BNP    Adult Transthoracic Echo Complete W/ Cont if Necessary Per Protocol   6. Mixed hyperlipidemia  Basic Metabolic Panel    BNP    Adult Transthoracic Echo Complete W/ Cont if Necessary Per Protocol          Plan:       MDM  Number of Diagnoses or Management Options  AAA (abdominal aortic aneurysm) without rupture (CMS/HCC): established, worsening  Acute on chronic combined systolic and diastolic congestive heart failure (CMS/HCC): established, worsening  Coronary artery disease involving native coronary artery of native heart without angina pectoris: established, worsening  Essential hypertension: established, worsening  Ischemic cardiomyopathy: established, worsening  Mixed hyperlipidemia: established, improving     Amount and/or Complexity of Data Reviewed  Clinical lab tests: reviewed  Review and summarize past medical records: yes    Risk of Complications, Morbidity, and/or Mortality  Presenting problems: high  Management options: high    Patient Progress  Patient progress: other (comment)

## 2021-04-09 NOTE — PROGRESS NOTES
Subjective:     Encounter Date:04/09/2021      Patient ID: Malachi Sanford is a 70 y.o. male.    Chief Complaint: Cardiomyopathy, CAD f/u     History of Present Illness     70-year-old white male patient with known history of CAD status post CABG almost 18 years ago history of COPD unfortunately continues to smoke history of dyslipidemia hypertension comes to see me for follow-up           Patient echocardiogram December 2018 EF 35-40% with anterolateral wall hypokinesis   I advise cardiac catheterization but patient refused        Recently patient had a CT scan of abdomen which showed 6.7 cm aneurysm  Patient was seen by vascular surgery decided not to undergo surgery and he fully understand the risks of sudden death by rupture  Unfortunately continues to smoke he comes to see me with increasing lower extremity edema shortness of breath  Blood pressure is low so advised him to be admitted to the hospital he does not want to do that  Options will be limited to treat him as an outpatient he fully understand he would like to continue current medications  Patient fully understand the risks of sudden cardiac death myocardial infarction worsening heart failure symptoms but still refused admission    Comes back with his daughter, and I explained to her about the risks  Recent labs reviewed BNP 6000 creatinine 1.29 and patient underwent echocardiogram March 2021 which showed EF 15% RV sizes mild to moderately dilated right ventricle systolic function decreased, moderate mitral insufficiency moderate severe pulmonary hypertension    Patient decided to continue only current treatment his lower extremity edema is mildly better  He wants to see me once a year    The following portions of the patient's history were reviewed and updated as appropriate: Allergies current medications past family history past medical history past social history past surgical history problem list and review of systems  Past Medical History:  "  Diagnosis Date   • Cardiomyopathy (CMS/HCC)    • CHF (congestive heart failure) (CMS/HCC)    • COPD (chronic obstructive pulmonary disease) (CMS/HCC)    • Coronary heart disease    • Depression    • Hyperlipidemia    • Hypertension      Past Surgical History:   Procedure Laterality Date   • CORONARY ARTERY BYPASS GRAFT      x4   • KNEE SURGERY Right      BP 95/66 (BP Location: Left arm, Patient Position: Sitting, Cuff Size: Adult)   Pulse 76   Temp 97.1 °F (36.2 °C) (Infrared)   Ht 180.3 cm (71\")   Wt 77.6 kg (171 lb)   SpO2 99%   BMI 23.85 kg/m²   Family History   Problem Relation Age of Onset   • No Known Problems Mother    • No Known Problems Father    • No Known Problems Sister    • Heart disease Brother    • No Known Problems Maternal Aunt    • No Known Problems Maternal Uncle    • No Known Problems Paternal Aunt    • No Known Problems Paternal Uncle    • No Known Problems Maternal Grandmother    • No Known Problems Maternal Grandfather    • No Known Problems Paternal Grandmother    • No Known Problems Paternal Grandfather    • No Known Problems Other    • Anemia Neg Hx    • Arrhythmia Neg Hx    • Asthma Neg Hx    • Clotting disorder Neg Hx    • Fainting Neg Hx    • Heart attack Neg Hx    • Heart failure Neg Hx    • Hyperlipidemia Neg Hx    • Hypertension Neg Hx        Current Outpatient Medications:   •  allopurinol (ZYLOPRIM) 300 MG tablet, Take  by mouth Daily., Disp: , Rfl:   •  aspirin 81 MG tablet, Take 81 mg by mouth Daily., Disp: , Rfl:   •  atorvastatin (LIPITOR) 80 MG tablet, TAKE 1 TABLET BY MOUTH EVERY NIGHT AT BEDTIME, Disp: 90 tablet, Rfl: 2  •  BREO ELLIPTA 200-25 MCG/INH inhaler, INL 1 PUFF PO QD, Disp: , Rfl:   •  carvedilol (COREG) 6.25 MG tablet, Take 6.25 mg by mouth 2 (Two) Times a Day With Meals., Disp: , Rfl:   •  clopidogrel (PLAVIX) 75 MG tablet, Take  by mouth Daily., Disp: , Rfl:   •  COMBIVENT RESPIMAT  MCG/ACT inhaler, INL 1 PUFF PO FOUR TIMES DAILY. MAY TK ADDITIONAL " PFS AS NEEDED. NTE 6 PFS IN 24 H, Disp: , Rfl:   •  ferrous sulfate 325 (65 FE) MG tablet, Take 1 tablet by mouth 2 (Two) Times a Day., Disp: , Rfl:   •  furosemide (LASIX) 40 MG tablet, Take  by mouth Daily., Disp: , Rfl:   •  losartan (COZAAR) 25 MG tablet, TAKE 1 TABLET BY MOUTH DAILY, Disp: 90 tablet, Rfl: 2  •  nitroglycerin (NITROSTAT) 0.4 MG SL tablet, , Disp: , Rfl:   •  potassium chloride ER (K-TAB) 20 MEQ tablet controlled-release ER tablet, TAKE 1 TABLET BY MOUTH DAILY, Disp: 90 tablet, Rfl: 3  •  sertraline (ZOLOFT) 50 MG tablet, Take  by mouth Daily., Disp: , Rfl:   •  theophylline (UNIPHYL) 400 MG 24 hr tablet, Take  by mouth Daily., Disp: , Rfl:   Social History     Socioeconomic History   • Marital status:      Spouse name: Not on file   • Number of children: Not on file   • Years of education: Not on file   • Highest education level: Not on file   Tobacco Use   • Smoking status: Current Every Day Smoker     Packs/day: 0.50     Types: Cigarettes   • Smokeless tobacco: Never Used   Vaping Use   • Vaping Use: Never used   Substance and Sexual Activity   • Alcohol use: Never   • Drug use: Yes     Types: Marijuana   • Sexual activity: Defer     No Known Allergies  Review of Systems   Constitutional: Negative for chills, fever and malaise/fatigue.   Cardiovascular: Positive for dyspnea on exertion and leg swelling. Negative for chest pain, palpitations and syncope.   Respiratory: Positive for shortness of breath.    Skin: Negative for rash.   Neurological: Negative for dizziness, light-headedness and numbness.              Objective:     Physical Exam  Vital stable neck no JVP elevation lungs but and mostly clear heart sounds S1-S2 regular 2/6 systolic murmur left sternal border extremities 1+ edema bilaterally  Procedures    Lab Review:       Assessment:          Diagnosis Plan   1. AAA (abdominal aortic aneurysm) without rupture (CMS/HCC)     2. Coronary artery disease involving native coronary  artery of native heart without angina pectoris     3. Ischemic cardiomyopathy     4. Mixed hyperlipidemia            Plan:       MDM  Number of Diagnoses or Management Options  AAA (abdominal aortic aneurysm) without rupture (CMS/HCC): established, worsening  Coronary artery disease involving native coronary artery of native heart without angina pectoris: established, improving  Ischemic cardiomyopathy: established, worsening  Mixed hyperlipidemia: established, improving     Amount and/or Complexity of Data Reviewed  Clinical lab tests: reviewed  Review and summarize past medical records: yes    Risk of Complications, Morbidity, and/or Mortality  Presenting problems: moderate  Management options: moderate    Patient Progress  Patient progress: other (comment)

## 2021-09-07 NOTE — TELEPHONE ENCOUNTER
Rx Refill Note  Requested Prescriptions     Pending Prescriptions Disp Refills   • losartan (COZAAR) 25 MG tablet [Pharmacy Med Name: LOSARTAN 25MG TABLETS] 90 tablet 2     Sig: TAKE 1 TABLET BY MOUTH DAILY      Last office visit with prescribing clinician: 4/9/2021      Next office visit with prescribing clinician: 4/11/2022            Katie Lester MA  09/07/21, 12:22 EDT